# Patient Record
Sex: FEMALE | Race: WHITE | Employment: UNEMPLOYED | URBAN - METROPOLITAN AREA
[De-identification: names, ages, dates, MRNs, and addresses within clinical notes are randomized per-mention and may not be internally consistent; named-entity substitution may affect disease eponyms.]

---

## 2017-01-13 ENCOUNTER — TELEPHONE (OUTPATIENT)
Dept: OBGYN | Facility: CLINIC | Age: 22
End: 2017-01-13

## 2017-01-13 DIAGNOSIS — N92.6 IRREGULAR MENSES: Primary | ICD-10-CM

## 2017-01-13 NOTE — Clinical Note
[facility]  [id number] [facility] [id number] [phone]      January 19, 2017      Camila Talley  [address] [facility] [id number]              Dear Camila,    Regarding a recent refill request we received- a six month refill was sent to the pharmacy.  You will be due to be seen in clinic this summer.  Please contact our office to schedule this appointment before your next refill is due.      Sincerely,      Zhane Flores CNP

## 2017-01-17 RX ORDER — NORGESTIMATE AND ETHINYL ESTRADIOL 0.25-0.035
1 KIT ORAL DAILY
Qty: 84 TABLET | Refills: 1 | Status: SHIPPED | OUTPATIENT
Start: 2017-01-17

## 2017-01-17 NOTE — TELEPHONE ENCOUNTER
This writer attempted to contact Camila on 01/17/2017.    Was call answered?  No.  Left message on voicemail with information to call me back.    If patient calls back, please give her advise per MELVI Vences, CNP as below. Six month Rx was sent to her pharmacy. Pt will need to have a physical this summer.  If further questions can transfer to [facility].  Kelly Thompson RN, BAN

## 2017-01-17 NOTE — TELEPHONE ENCOUNTER
Patient was last seen in July 2016.  She had a pap done which was normal.  Do you want her to be seen for a physical?  Her last visit was regarding irregular bleeding-she was a new patient to you.  I pended the refill if appropriate.  Bri Canales RN